# Patient Record
Sex: FEMALE | Race: BLACK OR AFRICAN AMERICAN | Employment: PART TIME | URBAN - METROPOLITAN AREA
[De-identification: names, ages, dates, MRNs, and addresses within clinical notes are randomized per-mention and may not be internally consistent; named-entity substitution may affect disease eponyms.]

---

## 2018-11-17 ENCOUNTER — HOSPITAL ENCOUNTER (EMERGENCY)
Age: 28
Discharge: HOME | End: 2018-11-17
Attending: EMERGENCY MEDICINE
Payer: SELF-PAY

## 2018-11-17 VITALS
TEMPERATURE: 99.1 F | SYSTOLIC BLOOD PRESSURE: 136 MMHG | DIASTOLIC BLOOD PRESSURE: 78 MMHG | WEIGHT: 135 LBS | RESPIRATION RATE: 16 BRPM | HEART RATE: 83 BPM | OXYGEN SATURATION: 100 % | BODY MASS INDEX: 19.99 KG/M2 | HEIGHT: 69 IN
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
## 2018-11-17 PROCEDURE — 99282 EMERGENCY DEPT VISIT SF MDM: CPT

## 2018-11-17 RX ORDER — DIPHENHYDRAMINE HCL 25 MG
25 CAPSULE ORAL
Qty: 10 CAP | Refills: 0 | Status: SHIPPED | OUTPATIENT
Start: 2018-11-17 | End: 2018-11-27

## 2018-11-17 RX ORDER — TRIAMCINOLONE ACETONIDE 1 MG/G
OINTMENT TOPICAL 2 TIMES DAILY
Qty: 30 G | Refills: 0 | Status: SHIPPED | OUTPATIENT
Start: 2018-11-17

## 2018-11-17 NOTE — ED PROVIDER NOTES
EMERGENCY DEPARTMENT HISTORY AND PHYSICAL EXAM 
 
4:45 PM 
 
 
Date: 11/17/2018 Patient Name: Viraj Coleman History of Presenting Illness Chief Complaint Patient presents with  Allergic Reaction History Provided By: Patient 4:45 PM Viraj Coleman is a 25 y.o. female with no pertinent medical history who presents to ED complaining of acute generalized hives and lumps, with associated symptoms of itching onset a few days ago. Patient notes moved into a house with cats about 1 week ago. Patient denies sob, or tongue swelling. Does not know if she has been bitten recently. No EtOH consumption, tobacco smoking or recreational drugs per patient. OTC Pellagra may help symptoms. Denies sexual activity. No other concerns or symptoms at this time. PCP: None Past History Past Medical History: 
History reviewed. No pertinent past medical history. Past Surgical History: 
History reviewed. No pertinent surgical history. Family History: 
History reviewed. No pertinent family history. Social History: 
Social History Tobacco Use  Smoking status: Never Smoker  Smokeless tobacco: Never Used Substance Use Topics  Alcohol use: No  
  Frequency: Never  Drug use: No  
 
 
Allergies: 
No Known Allergies Review of Systems Review of Systems Constitutional: Negative for fever. HENT: Negative for congestion. Respiratory: Negative for cough and shortness of breath. Cardiovascular: Negative for chest pain. Gastrointestinal: Negative for abdominal pain and vomiting. Musculoskeletal: Negative for back pain. Skin: Positive for rash. Neurological: Negative for light-headedness. All other systems reviewed and are negative. Physical Exam  
 
Visit Vitals /78 (BP 1 Location: Right arm, BP Patient Position: At rest;Sitting) Pulse 83 Temp 99.1 °F (37.3 °C) Resp 16 Ht 5' 9\" (1.753 m) Wt 61.2 kg (135 lb) LMP 10/25/2018 SpO2 100% BMI 19.94 kg/m² Physical Exam  
Constitutional: She is oriented to person, place, and time. She appears well-developed and well-nourished. No distress. HENT:  
Head: Normocephalic and atraumatic. Airway patent ithout intraoral edema Eyes: Conjunctivae are normal.  
Neck: Normal range of motion. Cardiovascular: Normal rate and regular rhythm. Pulmonary/Chest: Effort normal.  
Abdominal: She exhibits no distension. Musculoskeletal: Normal range of motion. Neurological: She is alert and oriented to person, place, and time. Skin: Skin is warm and dry. She is not diaphoretic.  
5 round red raised firm lesions to bilateral arms. No specific distribution. Approximately 1cm in size. No surrounding fluctuance or induration. No warmth. Psychiatric: She has a normal mood and affect. Nursing note and vitals reviewed. Diagnostic Study Results Vitals: 
Patient Vitals for the past 12 hrs: 
 Temp Pulse Resp BP SpO2  
11/17/18 1646 99.1 °F (37.3 °C) 83 16 136/78 100 % Medications ordered:  
Medications - No data to display Lab findings: 
No results found for this or any previous visit (from the past 12 hour(s)). X-Ray, CT or other radiology findings or impressions: No orders to display Progress notes, Consult notes or additional Procedure notes:  
Lesions appear to be insect bites rather than urticaria. No other symptoms of allergic rxn. Discussed treatment plan, return precautions, symptomatic relief, and expected time to improvement. All questions answered. Patient is stable for discharge and outpatient management. Disposition: 
Diagnosis:  
1. Insect bite, initial encounter Disposition:  
 
Follow-up Information Follow up With Specialties Details Why Contact [facility] EMERGENCY DEPT Emergency Medicine  Immediately if symptoms worsen, If symptoms do not improve [address] [facility] [address]. 
[phone] Medication List  
  
START taking these medications diphenhydrAMINE 25 mg capsule Commonly known as:  BENADRYL Take 1 Cap by mouth every six (6) hours as needed for up to 10 days. triamcinolone acetonide 0.1 % ointment Commonly known as:  KENALOG Apply  to affected area two (2) times a day. use thin layer Where to Get Your Medications Information about where to get these medications is not yet available Ask your nurse or doctor about these medications · diphenhydrAMINE 25 mg capsule · triamcinolone acetonide 0.1 % ointment Scribe Attestation Kodi JUAN PABLO Nugent PA-C acting as a scribe for and in the presence of Demetris Willis DO November 17, 2018 at 5:04 PM 
    
Provider Attestation:     
I personally performed the services described in the documentation, reviewed the documentation, as recorded by the scribe in my presence, and it accurately and completely records my words and actions.  November 17, 2018 at 5:04 PM - Demetris Willis DO